# Patient Record
Sex: MALE | Race: BLACK OR AFRICAN AMERICAN | Employment: OTHER | URBAN - METROPOLITAN AREA
[De-identification: names, ages, dates, MRNs, and addresses within clinical notes are randomized per-mention and may not be internally consistent; named-entity substitution may affect disease eponyms.]

---

## 2024-06-19 ENCOUNTER — OFFICE VISIT (OUTPATIENT)
Dept: NEUROLOGY | Age: 79
End: 2024-06-19
Payer: MEDICARE

## 2024-06-19 VITALS
OXYGEN SATURATION: 98 % | SYSTOLIC BLOOD PRESSURE: 125 MMHG | DIASTOLIC BLOOD PRESSURE: 74 MMHG | HEART RATE: 73 BPM | WEIGHT: 138.6 LBS

## 2024-06-19 DIAGNOSIS — R20.2 TINGLING OF BOTH UPPER EXTREMITIES: ICD-10-CM

## 2024-06-19 DIAGNOSIS — R29.898 WEAKNESS OF BOTH HANDS: Primary | ICD-10-CM

## 2024-06-19 DIAGNOSIS — R26.89 IMBALANCE: ICD-10-CM

## 2024-06-19 DIAGNOSIS — R29.898 WEAKNESS OF BOTH HANDS: ICD-10-CM

## 2024-06-19 DIAGNOSIS — Z13.89 SCREENING FOR DIABETIC PERIPHERAL NEUROPATHY: ICD-10-CM

## 2024-06-19 DIAGNOSIS — M79.2 NEUROPATHIC PAIN: ICD-10-CM

## 2024-06-19 DIAGNOSIS — R29.2 HYPERREFLEXIA OF LOWER EXTREMITY: ICD-10-CM

## 2024-06-19 LAB
EST. AVERAGE GLUCOSE BLD GHB EST-MCNC: 119 MG/DL
FOLATE SERPL-MCNC: 8.2 NG/ML (ref 3.1–17.5)
HBA1C MFR BLD: 5.8 % (ref 0–5.6)
TSH, 3RD GENERATION: 1.53 UIU/ML (ref 0.27–4.2)
VIT B12 SERPL-MCNC: 269 PG/ML (ref 193–986)

## 2024-06-19 PROCEDURE — 1123F ACP DISCUSS/DSCN MKR DOCD: CPT | Performed by: PSYCHIATRY & NEUROLOGY

## 2024-06-19 PROCEDURE — 99205 OFFICE O/P NEW HI 60 MIN: CPT | Performed by: PSYCHIATRY & NEUROLOGY

## 2024-06-19 RX ORDER — ALBUTEROL SULFATE 90 UG/1
2 AEROSOL, METERED RESPIRATORY (INHALATION) EVERY 4 HOURS PRN
COMMUNITY
Start: 2022-12-20

## 2024-06-19 RX ORDER — LISINOPRIL 10 MG/1
5 TABLET ORAL DAILY
COMMUNITY
Start: 2022-08-26

## 2024-06-19 RX ORDER — UMECLIDINIUM BROMIDE AND VILANTEROL TRIFENATATE 62.5; 25 UG/1; UG/1
POWDER RESPIRATORY (INHALATION)
COMMUNITY
Start: 2020-12-14

## 2024-06-19 RX ORDER — ATORVASTATIN CALCIUM 40 MG/1
20 TABLET, FILM COATED ORAL DAILY
COMMUNITY
Start: 2022-08-26

## 2024-06-19 RX ORDER — TADALAFIL 20 MG
20 TABLET ORAL PRN
COMMUNITY
Start: 2019-04-03

## 2024-06-19 RX ORDER — SOLIFENACIN SUCCINATE 10 MG/1
10 TABLET, FILM COATED ORAL EVERY MORNING
COMMUNITY
Start: 2017-12-14

## 2024-06-19 RX ORDER — TAMSULOSIN HYDROCHLORIDE 0.4 MG/1
0.4 CAPSULE ORAL DAILY
COMMUNITY
Start: 2023-10-02

## 2024-06-19 ASSESSMENT — PATIENT HEALTH QUESTIONNAIRE - PHQ9
SUM OF ALL RESPONSES TO PHQ QUESTIONS 1-9: 0
SUM OF ALL RESPONSES TO PHQ QUESTIONS 1-9: 0
SUM OF ALL RESPONSES TO PHQ9 QUESTIONS 1 & 2: 0
SUM OF ALL RESPONSES TO PHQ QUESTIONS 1-9: 0
2. FEELING DOWN, DEPRESSED OR HOPELESS: NOT AT ALL
DEPRESSION UNABLE TO ASSESS: FUNCTIONAL CAPACITY MOTIVATION LIMITS ACCURACY
1. LITTLE INTEREST OR PLEASURE IN DOING THINGS: NOT AT ALL
SUM OF ALL RESPONSES TO PHQ QUESTIONS 1-9: 0

## 2024-06-19 ASSESSMENT — ENCOUNTER SYMPTOMS
SORE THROAT: 0
ABDOMINAL DISTENTION: 0
COUGH: 0
EYE DISCHARGE: 0
EYE REDNESS: 0
SHORTNESS OF BREATH: 0

## 2024-06-19 NOTE — PROGRESS NOTES
imaging demonstrated severe spinal canal stenosis back at T10 level in 2020.  It would not explain his hands symptoms though.  I will obtain some basic neuropathy workup with blood work today.  Borderline B12 level 2 years ago so advised patient to start on B12 supplementation.    Plan:  - MRI T-spine with without contrast  - MRI C-spine with without contrast  - Obtain B6, folate, B12, methylmalonic acid, hemoglobin A1c    Return in about 4 months (around 10/19/2024) for Neuropathy follow up.      Felisa Flores MD   General Neurology   [address], [address]  [place] [address]  Phone: [phone]    All medications and relevant precautions were fully reviewed with the patient. More than 50% of this visit was used to evaluate, educate and coordinate care for the patient for the above concerns. Total visit time: 61 minutes. Time includes pre- and post- face-to-face time, including record review of available pertinent images and reports. I have written all aspects of this note, parts of which were produced using speech recognition software, which may contain inadvertent errors in the produced words.

## 2024-06-21 LAB — VIT B6 SERPL-MCNC: 5.5 UG/L (ref 3.4–65.2)

## 2024-06-24 LAB — METHYLMALONATE SERPL-SCNC: 177 NMOL/L (ref 0–378)

## 2024-07-02 ENCOUNTER — HOSPITAL ENCOUNTER (OUTPATIENT)
Age: 79
Discharge: HOME | End: 2024-07-04
Attending: PSYCHIATRY & NEUROLOGY
Payer: MEDICARE

## 2024-07-02 DIAGNOSIS — R29.898 WEAKNESS OF BOTH HANDS: ICD-10-CM

## 2024-07-02 DIAGNOSIS — R26.89 IMBALANCE: ICD-10-CM

## 2024-07-02 DIAGNOSIS — R20.2 TINGLING OF BOTH UPPER EXTREMITIES: ICD-10-CM

## 2024-07-02 PROCEDURE — 72157 MRI CHEST SPINE W/O & W/DYE: CPT

## 2024-07-02 PROCEDURE — 72156 MRI NECK SPINE W/O & W/DYE: CPT

## 2024-07-02 PROCEDURE — 6360000004 HC RX CONTRAST MEDICATION: Performed by: PSYCHIATRY & NEUROLOGY

## 2024-07-02 PROCEDURE — A9579 GAD-BASE MR CONTRAST NOS,1ML: HCPCS | Performed by: PSYCHIATRY & NEUROLOGY

## 2024-07-02 RX ADMIN — GADOTERIDOL 12 ML: 279.3 INJECTION, SOLUTION INTRAVENOUS at 13:43
